# Patient Record
Sex: FEMALE | Race: WHITE | Employment: FULL TIME | URBAN - METROPOLITAN AREA
[De-identification: names, ages, dates, MRNs, and addresses within clinical notes are randomized per-mention and may not be internally consistent; named-entity substitution may affect disease eponyms.]

---

## 2021-11-11 LAB — PAP SMEAR, EXTERNAL: NEGATIVE

## 2024-01-29 ENCOUNTER — OFFICE VISIT (OUTPATIENT)
Dept: PRIMARY CARE CLINIC | Age: 39
End: 2024-01-29
Payer: COMMERCIAL

## 2024-01-29 VITALS
SYSTOLIC BLOOD PRESSURE: 110 MMHG | BODY MASS INDEX: 19.28 KG/M2 | HEIGHT: 68 IN | RESPIRATION RATE: 12 BRPM | OXYGEN SATURATION: 100 % | HEART RATE: 86 BPM | DIASTOLIC BLOOD PRESSURE: 68 MMHG | WEIGHT: 127.2 LBS | TEMPERATURE: 97.4 F

## 2024-01-29 DIAGNOSIS — J45.990 EXERCISE-INDUCED ASTHMA: Primary | ICD-10-CM

## 2024-01-29 PROCEDURE — 99203 OFFICE O/P NEW LOW 30 MIN: CPT | Performed by: FAMILY MEDICINE

## 2024-01-29 RX ORDER — ALBUTEROL SULFATE 90 UG/1
2 AEROSOL, METERED RESPIRATORY (INHALATION) DAILY PRN
Qty: 18 G | Refills: 3 | Status: SHIPPED | OUTPATIENT
Start: 2024-01-29

## 2024-01-29 RX ORDER — ALBUTEROL SULFATE 90 UG/1
2 AEROSOL, METERED RESPIRATORY (INHALATION) EVERY 6 HOURS PRN
COMMUNITY
End: 2024-01-29 | Stop reason: SDUPTHER

## 2024-01-29 SDOH — ECONOMIC STABILITY: HOUSING INSECURITY
IN THE LAST 12 MONTHS, WAS THERE A TIME WHEN YOU DID NOT HAVE A STEADY PLACE TO SLEEP OR SLEPT IN A SHELTER (INCLUDING NOW)?: NO

## 2024-01-29 SDOH — ECONOMIC STABILITY: FOOD INSECURITY: WITHIN THE PAST 12 MONTHS, THE FOOD YOU BOUGHT JUST DIDN'T LAST AND YOU DIDN'T HAVE MONEY TO GET MORE.: NEVER TRUE

## 2024-01-29 SDOH — ECONOMIC STABILITY: FOOD INSECURITY: WITHIN THE PAST 12 MONTHS, YOU WORRIED THAT YOUR FOOD WOULD RUN OUT BEFORE YOU GOT MONEY TO BUY MORE.: NEVER TRUE

## 2024-01-29 SDOH — ECONOMIC STABILITY: INCOME INSECURITY: HOW HARD IS IT FOR YOU TO PAY FOR THE VERY BASICS LIKE FOOD, HOUSING, MEDICAL CARE, AND HEATING?: NOT HARD AT ALL

## 2024-01-29 ASSESSMENT — ENCOUNTER SYMPTOMS
RHINORRHEA: 0
ABDOMINAL PAIN: 0
COLOR CHANGE: 0
DIARRHEA: 0
BLOOD IN STOOL: 0
NAUSEA: 0
COUGH: 0
SHORTNESS OF BREATH: 0
VOMITING: 0

## 2024-01-29 ASSESSMENT — PATIENT HEALTH QUESTIONNAIRE - PHQ9
1. LITTLE INTEREST OR PLEASURE IN DOING THINGS: 0
SUM OF ALL RESPONSES TO PHQ QUESTIONS 1-9: 0
2. FEELING DOWN, DEPRESSED OR HOPELESS: 0
SUM OF ALL RESPONSES TO PHQ9 QUESTIONS 1 & 2: 0
SUM OF ALL RESPONSES TO PHQ QUESTIONS 1-9: 0

## 2024-01-29 NOTE — PATIENT INSTRUCTIONS
Look to see if your prenatal testing blood work includes Hepatitis C, please and thank you!    For help and support with [employer] christian/portal set-up, please call [phone].     Indications for injectable epinephrine usage provided during the visit as noted below:  Hives and minor swelling can be managed with oral antihistamines, but epinephrine should be used for facial swelling, trouble breathing, increased coughing, recurrent vomiting- or any combination of these symptoms.   Recommend having 2 autoinjectors around for management, as >20% of patients who need Epinephrine may need the second dose.  Epinephrine is sensitive to light and extreme temperatures.   Store the devices away from direct light and at room temperature.  Do not refrigerate epinephrine, and be careful to prevent the device from freezing.   Never keep it in a vehicle, where temperatures can climb to triple digits. These conditions can cause the medication to become less effective.  Recommend practicing with  at least once a month and practicing an allergic reaction event reaction to help reduce anxiety surrounding the need for the injection.

## 2024-01-29 NOTE — ASSESSMENT & PLAN NOTE
Well controlled on current regimen. Medication refilled as noted. Denied recent H/I/E. Counseled on PCV 20 vaccine if desired, she will consider this. Call back/ED precautions discussed.

## 2024-01-29 NOTE — PROGRESS NOTES
Mari Pedro is a 39 y.o. year old female here for:    Chief Complaint:    Chief Complaint   Patient presents with    Asthma     Subjective:    Today, her current concerns include:    HPI:  #Asthma  - Onset: Years  - Context: Exercise induced only. Also with multiple food allergies, has an epi-pen, does not want to f/u with allergist  - Severity: Denied recent H/I/E  - Timing/Duration: As above  - Progression: Stable  - Modifiers: Use albuterol daily with exercise    Past Medical History:   Diagnosis Date    Exercise-induced asthma     Food allergy     History of COVID-19     Hx of concussion     Oct 2023 after a fall     Social History     Tobacco Use    Smoking status: Never    Smokeless tobacco: Never   Substance Use Topics    Alcohol use: Never    Drug use: Never     Family History   Problem Relation Age of Onset    Breast Cancer Maternal Grandmother         Dx age over 60    Colon Cancer Neg Hx      Past Surgical History:   Procedure Laterality Date    LIPOSUCTION Left     After trauma for repair       Current Outpatient Medications:     PRENATAL VIT-FE SULFATE-FA PO, Take 1 tablet by mouth daily, Disp: , Rfl:     albuterol sulfate HFA (VENTOLIN HFA) 108 (90 Base) MCG/ACT inhaler, Inhale 2 puffs into the lungs daily as needed for Wheezing (during exercise), Disp: 18 g, Rfl: 3  Allergies   Allergen Reactions    Dairycare [Lactase-Lactobacillus] Anaphylaxis     DAIRY    Hydrocodone Shortness Of Breath, Itching, Rash and Hallucinations    Mushroom Extract Complex Anaphylaxis    Eggs Or Egg-Derived Products        Review of Systems:  Review of Systems   Constitutional:  Negative for chills, diaphoresis and fever.   HENT:  Negative for congestion and rhinorrhea.    Respiratory:  Negative for cough and shortness of breath.    Cardiovascular:  Negative for chest pain.   Gastrointestinal:  Negative for abdominal pain, blood in stool, diarrhea, nausea and vomiting.   Genitourinary:  Negative for difficulty

## 2024-02-08 ENCOUNTER — OFFICE VISIT (OUTPATIENT)
Dept: URGENT CARE | Age: 39
End: 2024-02-08

## 2024-02-08 ENCOUNTER — TELEPHONE (OUTPATIENT)
Dept: PRIMARY CARE CLINIC | Age: 39
End: 2024-02-08

## 2024-02-08 VITALS
DIASTOLIC BLOOD PRESSURE: 61 MMHG | OXYGEN SATURATION: 99 % | SYSTOLIC BLOOD PRESSURE: 96 MMHG | HEART RATE: 68 BPM | TEMPERATURE: 98.2 F | RESPIRATION RATE: 16 BRPM | WEIGHT: 130 LBS | BODY MASS INDEX: 19.91 KG/M2

## 2024-02-08 DIAGNOSIS — R05.9 COUGH, UNSPECIFIED TYPE: ICD-10-CM

## 2024-02-08 DIAGNOSIS — R11.2 NAUSEA AND VOMITING, UNSPECIFIED VOMITING TYPE: ICD-10-CM

## 2024-02-08 DIAGNOSIS — J06.9 VIRAL URI: Primary | ICD-10-CM

## 2024-02-08 LAB
INFLUENZA VIRUS A RNA: NEGATIVE
INFLUENZA VIRUS B RNA: NEGATIVE

## 2024-02-08 RX ORDER — PROMETHAZINE HYDROCHLORIDE 25 MG/1
25 TABLET ORAL 4 TIMES DAILY PRN
Qty: 20 TABLET | Refills: 0 | Status: SHIPPED | OUTPATIENT
Start: 2024-02-08 | End: 2024-02-15

## 2024-02-08 RX ORDER — GUAIFENESIN 600 MG/1
600 TABLET, EXTENDED RELEASE ORAL 2 TIMES DAILY
Qty: 30 TABLET | Refills: 0 | Status: SHIPPED | OUTPATIENT
Start: 2024-02-08 | End: 2024-02-23

## 2024-02-08 RX ORDER — EPINEPHRINE 0.3 MG/.3ML
0.3 INJECTION SUBCUTANEOUS PRN
COMMUNITY
Start: 2023-03-24

## 2024-02-08 ASSESSMENT — ENCOUNTER SYMPTOMS
SHORTNESS OF BREATH: 0
COUGH: 1
SINUS PAIN: 0
EYE REDNESS: 0
SORE THROAT: 0
EYE DISCHARGE: 0
EYE PAIN: 0
DIARRHEA: 1
VOMITING: 1
NAUSEA: 1
ABDOMINAL PAIN: 0

## 2024-02-08 NOTE — PATIENT INSTRUCTIONS
- Pt to drink lots of fluids  - Pt to take medication as prescribed  - Pt ok to take tylenol and inhaler as needed  - Pt to call if any symptoms worsen or follow up with PCP  - Pt to go to ER if have shortness of breath or chest pain

## 2024-02-08 NOTE — TELEPHONE ENCOUNTER
Pt called stating that she has been feeling sick for a couple of days and her  is also sick.  Pt is concerned for COVID and was requesting to be seen in office today, 2/8/24.  Advised pt that Dr Werner is out of the office until 2/12/24.  Pt inquired about [facility] located around [place].  Information for the [facility] in [place] was provided to pt.  Pt sts she plans to go to the Urgent Care.      Advised pt to call the office if she needs anything from us.

## 2024-02-09 NOTE — PROGRESS NOTES
Pt contacted clinic noting still feeling ill and requesting extension for her sick note.  
Tonsils: No tonsillar exudate or tonsillar abscesses.   Eyes:      Conjunctiva/sclera: Conjunctivae normal.      Pupils: Pupils are equal, round, and reactive to light.   Cardiovascular:      Rate and Rhythm: Normal rate and regular rhythm.      Pulses: Normal pulses.      Heart sounds: Normal heart sounds.   Pulmonary:      Effort: Pulmonary effort is normal. No tachypnea, bradypnea, accessory muscle usage, prolonged expiration, respiratory distress or retractions. She is not intubated.      Breath sounds: Normal breath sounds and air entry. No stridor, decreased air movement or transmitted upper airway sounds. No decreased breath sounds, wheezing, rhonchi or rales.   Musculoskeletal:      Cervical back: Normal range of motion and neck supple.   Lymphadenopathy:      Cervical: No cervical adenopathy.   Skin:     General: Skin is warm and dry.   Neurological:      General: No focal deficit present.      Mental Status: She is alert and oriented to person, place, and time.   Psychiatric:         Mood and Affect: Mood normal.         Behavior: Behavior normal.         Thought Content: Thought content normal.         Judgment: Judgment normal.         PROCEDURES:  Unless otherwise noted below, none     Procedures    RESULTS:  Results for POC orders placed in visit on 02/08/24   POCT Influenza A/B DNA (Alere i)   Result Value Ref Range    Influenza virus A RNA NEGATIVE     Influenza virus B RNA NEGATIVE        An electronic signature was used to authenticate this note.    --Macey Lopez PA-C